# Patient Record
Sex: FEMALE | Race: WHITE | URBAN - NONMETROPOLITAN AREA
[De-identification: names, ages, dates, MRNs, and addresses within clinical notes are randomized per-mention and may not be internally consistent; named-entity substitution may affect disease eponyms.]

---

## 2022-10-26 ENCOUNTER — OFFICE VISIT (OUTPATIENT)
Dept: URBAN - NONMETROPOLITAN AREA CLINIC 13 | Facility: CLINIC | Age: 22
End: 2022-10-26
Payer: COMMERCIAL

## 2022-10-26 ENCOUNTER — WEB ENCOUNTER (OUTPATIENT)
Dept: URBAN - NONMETROPOLITAN AREA CLINIC 13 | Facility: CLINIC | Age: 22
End: 2022-10-26

## 2022-10-26 ENCOUNTER — LAB OUTSIDE AN ENCOUNTER (OUTPATIENT)
Dept: URBAN - NONMETROPOLITAN AREA CLINIC 2 | Facility: CLINIC | Age: 22
End: 2022-10-26

## 2022-10-26 ENCOUNTER — TELEPHONE ENCOUNTER (OUTPATIENT)
Dept: URBAN - METROPOLITAN AREA CLINIC 92 | Facility: CLINIC | Age: 22
End: 2022-10-26

## 2022-10-26 ENCOUNTER — LAB OUTSIDE AN ENCOUNTER (OUTPATIENT)
Dept: URBAN - NONMETROPOLITAN AREA CLINIC 13 | Facility: CLINIC | Age: 22
End: 2022-10-26

## 2022-10-26 VITALS
HEIGHT: 64 IN | HEART RATE: 105 BPM | BODY MASS INDEX: 30.8 KG/M2 | DIASTOLIC BLOOD PRESSURE: 85 MMHG | SYSTOLIC BLOOD PRESSURE: 119 MMHG | WEIGHT: 180.4 LBS

## 2022-10-26 DIAGNOSIS — K50.918 CROHN'S DISEASE WITH OTHER COMPLICATION, UNSPECIFIED GASTROINTESTINAL TRACT LOCATION: ICD-10-CM

## 2022-10-26 DIAGNOSIS — Z12.11 COLON CANCER SCREENING: ICD-10-CM

## 2022-10-26 LAB
A/G RATIO: 1.2
ALBUMIN: 4.5
ALKALINE PHOSPHATASE: 65
ALT (SGPT): 19
ANION GAP: 13
AST (SGOT): 18
BASOPHILS AUTOMATED ABSOLUTE COUNT: 0.1
BASOPHILS RELATIVE PERCENT: 0.8
BILIRUBIN TOTAL: 0.4
BLOOD UREA NITROGEN: 9
BUN / CREAT RATIO: 17
C-REACTIVE PROTEIN: 0.03
CALCIUM: 9.7
CHLORIDE: 107
CO2: 21
CREATININE, SERUM: 0.54
EGFR (CKD-EPI): >60
EOSINOPHILS AUTOMATED ABSOLUTE COUNT: 0.3
EOSINOPHILS RELATIVE PERCENT: 3.2
GLUCOSE: 93
HEMATOCRIT: 38.3
HEMOGLOBIN: 12.7
LYMPHOCYTES AUTOMATED ABSOLUTE COUNT: 1.8
LYMPHOCYTES RELATIVE PERCENT: 22.7
MEAN CORPUSCULAR HEMOGLOBIN CONC: 33.2
MEAN CORPUSCULAR HEMOGLOBIN: 28.5
MEAN CORPUSCULAR VOLUME: 85.9
MEAN PLATELET VOLUME: 7.6
MONOCYTES AUTOMATED ABSOLUTE COUNT: 0.4
MONOCYTES RELATIVE PERCENT: 4.6
NEUTROPHILS AUTOMATED ABSOLUTE: 5.5
NEUTROPHILS RELATIVE PERCENT: 68.7
PLATELET COUNT: 284
POTASSIUM: 4.1
PROTEIN TOTAL: 8.4
RED BLOOD CELL COUNT: 4.46
RED CELL DISTRIBUTION WIDTH: 13.1
SEDIMENTATION RATE, ERYTHROCYTE: 19
SODIUM: 137
WHITE BLOOD CELL COUNT: 7.9

## 2022-10-26 PROCEDURE — 99244 OFF/OP CNSLTJ NEW/EST MOD 40: CPT | Performed by: INTERNAL MEDICINE

## 2022-10-26 PROCEDURE — 99244 OFF/OP CNSLTJ NEW/EST MOD 40: CPT | Performed by: NURSE PRACTITIONER

## 2022-10-26 PROCEDURE — 99204 OFFICE O/P NEW MOD 45 MIN: CPT | Performed by: INTERNAL MEDICINE

## 2022-10-26 RX ORDER — INFLIXIMAB 100 MG/10ML
AS DIRECTED INJECTION, POWDER, LYOPHILIZED, FOR SOLUTION INTRAVENOUS
Status: ACTIVE | COMMUNITY

## 2022-10-26 RX ORDER — SODIUM PICOSULFATE, MAGNESIUM OXIDE, AND ANHYDROUS CITRIC ACID 10; 3.5; 12 MG/160ML; G/160ML; G/160ML
160ML X 2 AS DIRECTED LIQUID ORAL ONCE
Qty: 320 MILLILITER | Refills: 0 | OUTPATIENT
Start: 2022-10-26 | End: 2022-10-27

## 2022-10-26 RX ORDER — INFLIXIMAB 100 MG/10ML
7.5MG/KG EVERY 6 WEEKS INJECTION, POWDER, LYOPHILIZED, FOR SOLUTION INTRAVENOUS
Qty: 6 UNSPECIFIED | Refills: 7 | OUTPATIENT
Start: 2022-10-26 | End: 2023-10-21

## 2022-10-29 LAB
MITOGEN-NIL: >10
NIL: 0.16
QUANTIFERON-TB PLUS,1T: NEGATIVE
TB1-NIL: 0.03
TB2-NIL: 0.02

## 2022-11-08 ENCOUNTER — TELEPHONE ENCOUNTER (OUTPATIENT)
Dept: URBAN - NONMETROPOLITAN AREA CLINIC 2 | Facility: CLINIC | Age: 22
End: 2022-11-08

## 2022-12-22 ENCOUNTER — TELEPHONE ENCOUNTER (OUTPATIENT)
Dept: URBAN - NONMETROPOLITAN AREA CLINIC 13 | Facility: CLINIC | Age: 22
End: 2022-12-22

## 2022-12-22 RX ORDER — PREDNISONE 20 MG/1
40MG X7 DAYS, 30MGX 7 DAYS, 20MG X 7 DAYS, AND 10MG X7 DAYS AND STOP TABLET ORAL ONCE A DAY
Qty: 35 TABLET | Refills: 0 | OUTPATIENT
Start: 2022-12-22 | End: 2023-01-19

## 2023-02-13 ENCOUNTER — CLAIMS CREATED FROM THE CLAIM WINDOW (OUTPATIENT)
Dept: URBAN - NONMETROPOLITAN AREA SURGERY CENTER 1 | Facility: SURGERY CENTER | Age: 23
End: 2023-02-13
Payer: COMMERCIAL

## 2023-02-13 ENCOUNTER — CLAIMS CREATED FROM THE CLAIM WINDOW (OUTPATIENT)
Dept: URBAN - NONMETROPOLITAN AREA SURGERY CENTER 1 | Facility: SURGERY CENTER | Age: 23
End: 2023-02-13

## 2023-02-13 ENCOUNTER — CLAIMS CREATED FROM THE CLAIM WINDOW (OUTPATIENT)
Dept: URBAN - METROPOLITAN AREA CLINIC 4 | Facility: CLINIC | Age: 23
End: 2023-02-13
Payer: COMMERCIAL

## 2023-02-13 DIAGNOSIS — K50.80 CROHN'S COLITIS: ICD-10-CM

## 2023-02-13 DIAGNOSIS — K63.89 APPENDICITIS EPIPLOICA: ICD-10-CM

## 2023-02-13 DIAGNOSIS — K31.89 OTHER DISEASES OF STOMACH AND DUODENUM: ICD-10-CM

## 2023-02-13 PROCEDURE — G8907 PT DOC NO EVENTS ON DISCHARG: HCPCS | Performed by: INTERNAL MEDICINE

## 2023-02-13 PROCEDURE — 45380 COLONOSCOPY AND BIOPSY: CPT | Performed by: INTERNAL MEDICINE

## 2023-02-13 PROCEDURE — 88305 TISSUE EXAM BY PATHOLOGIST: CPT | Performed by: PATHOLOGY

## 2023-02-13 RX ORDER — INFLIXIMAB 100 MG/10ML
7.5MG/KG EVERY 6 WEEKS INJECTION, POWDER, LYOPHILIZED, FOR SOLUTION INTRAVENOUS
Qty: 6 UNSPECIFIED | Refills: 7 | Status: ACTIVE | COMMUNITY
Start: 2022-10-26 | End: 2023-10-21

## 2023-02-13 RX ORDER — INFLIXIMAB 100 MG/10ML
AS DIRECTED INJECTION, POWDER, LYOPHILIZED, FOR SOLUTION INTRAVENOUS
Status: ACTIVE | COMMUNITY

## 2023-02-15 ENCOUNTER — TELEPHONE ENCOUNTER (OUTPATIENT)
Dept: URBAN - NONMETROPOLITAN AREA CLINIC 2 | Facility: CLINIC | Age: 23
End: 2023-02-15

## 2023-02-24 ENCOUNTER — TELEPHONE ENCOUNTER (OUTPATIENT)
Dept: URBAN - METROPOLITAN AREA CLINIC 92 | Facility: CLINIC | Age: 23
End: 2023-02-24

## 2023-02-27 ENCOUNTER — TELEPHONE ENCOUNTER (OUTPATIENT)
Dept: URBAN - NONMETROPOLITAN AREA CLINIC 2 | Facility: CLINIC | Age: 23
End: 2023-02-27

## 2023-02-28 ENCOUNTER — WEB ENCOUNTER (OUTPATIENT)
Dept: URBAN - NONMETROPOLITAN AREA CLINIC 2 | Facility: CLINIC | Age: 23
End: 2023-02-28

## 2023-02-28 RX ORDER — INFLIXIMAB-DYYB 100 MG/10ML
7.5MG/KG INJECTION, POWDER, LYOPHILIZED, FOR SOLUTION INTRAVENOUS
Qty: 1 UNSPECIFIED | Refills: 8 | OUTPATIENT
Start: 2023-02-28 | End: 2024-03-12

## 2023-02-28 RX ORDER — INFLIXIMAB 100 MG/10ML
7.5MG/KG EVERY 6 WEEKS INJECTION, POWDER, LYOPHILIZED, FOR SOLUTION INTRAVENOUS
Qty: 6 UNSPECIFIED | Refills: 7 | Status: ACTIVE | COMMUNITY
Start: 2022-10-26 | End: 2023-10-21

## 2023-02-28 RX ORDER — INFLIXIMAB 100 MG/10ML
AS DIRECTED INJECTION, POWDER, LYOPHILIZED, FOR SOLUTION INTRAVENOUS
Status: ACTIVE | COMMUNITY

## 2023-03-20 ENCOUNTER — WEB ENCOUNTER (OUTPATIENT)
Dept: URBAN - NONMETROPOLITAN AREA CLINIC 13 | Facility: CLINIC | Age: 23
End: 2023-03-20

## 2023-03-20 ENCOUNTER — TELEPHONE ENCOUNTER (OUTPATIENT)
Dept: URBAN - NONMETROPOLITAN AREA CLINIC 13 | Facility: CLINIC | Age: 23
End: 2023-03-20

## 2023-03-22 ENCOUNTER — OFFICE VISIT (OUTPATIENT)
Dept: URBAN - NONMETROPOLITAN AREA CLINIC 13 | Facility: CLINIC | Age: 23
End: 2023-03-22
Payer: COMMERCIAL

## 2023-03-22 VITALS
BODY MASS INDEX: 30.39 KG/M2 | TEMPERATURE: 97.8 F | SYSTOLIC BLOOD PRESSURE: 148 MMHG | HEART RATE: 93 BPM | DIASTOLIC BLOOD PRESSURE: 97 MMHG | WEIGHT: 178 LBS | HEIGHT: 64 IN

## 2023-03-22 DIAGNOSIS — Z12.11 COLON CANCER SCREENING: ICD-10-CM

## 2023-03-22 DIAGNOSIS — K50.918 CROHN'S DISEASE WITH OTHER COMPLICATION, UNSPECIFIED GASTROINTESTINAL TRACT LOCATION: ICD-10-CM

## 2023-03-22 PROCEDURE — 99214 OFFICE O/P EST MOD 30 MIN: CPT | Performed by: NURSE PRACTITIONER

## 2023-03-22 RX ORDER — INFLIXIMAB 100 MG/10ML
7.5MG/KG EVERY 6 WEEKS INJECTION, POWDER, LYOPHILIZED, FOR SOLUTION INTRAVENOUS
Qty: 6 UNSPECIFIED | Refills: 7 | Status: ACTIVE | COMMUNITY
Start: 2022-10-26 | End: 2023-10-21

## 2023-03-22 RX ORDER — BUDESONIDE 3 MG/1
3 CAPSULES CAPSULE, DELAYED RELEASE PELLETS ORAL ONCE A DAY
Qty: 90 CAPSULE | Refills: 1 | OUTPATIENT
Start: 2023-03-22

## 2023-03-22 RX ORDER — INFLIXIMAB-DYYB 100 MG/10ML
7.5MG/KG INJECTION, POWDER, LYOPHILIZED, FOR SOLUTION INTRAVENOUS
Qty: 1 UNSPECIFIED | Refills: 8 | Status: ACTIVE | COMMUNITY
Start: 2023-02-28 | End: 2024-03-12

## 2023-03-22 RX ORDER — INFLIXIMAB 100 MG/10ML
AS DIRECTED INJECTION, POWDER, LYOPHILIZED, FOR SOLUTION INTRAVENOUS
Status: ACTIVE | COMMUNITY

## 2023-03-22 NOTE — HPI-TODAY'S VISIT:
10/26/2022 Graciela is a 21-year-old female referred to me by Dr. Isacc Doll for consultation and to establish care for Crohn's disease.  At age 15 she underwent colonoscopy by Dr. Wolf Hall and was diagnosed with Crohn's disease.  She was referred to Dr. Luis Mohamud.  She was initiated on Humira at first however she never obtained remission.  She was transitioned to infliximab at age 17 and subsequently underwent TI resection with 2 feet of her small bowel removed at [facility] .  Since then she has been in remission on Remicade every 6 weeks, we do not have her records and she is not sure of her dosing.  Her last dose as listed below in late September she is due in 2 weeks.  She states that her bowels move regularly, she has not had a flare in years.  She states her last colonoscopy was done approximately 3 years ago by Dr. Mohamud.  She is not sure of these results.  She has not had her B12 tested, she has never been supplemented.  She has never had a Pap smear.  She has not had any skin cancer screening.  She is newly , and works full-time at [employer].  She does plan on conceiving in the next 2 to 3 years.  Today we have had a discussion regarding surveillance colonoscopy, annual Pap screening, annual skin cancer screening, B12 evaluation given her TI resection, and infusion set up.  We will refer her to gynecology and dermatology.  We will get her set up with [facility] infusion center for her Remicade dosing every 6 weeks once we know her infusion dose, she was [facility] [place].  Today she is doing well, we have had a long discussion regarding her disease and her questions were answered.  I will see her back after her procedure.  MB 3/22/2023 Graciela presents for follow-up of Crohn's disease.  Her repeat colonoscopy reveals aphthous ulcers in her TI, and active intact anastomosis with mild erythema, normal colon and mild proctitis.  Her biopsy showed focal active ileitis and normal colon and rectal biopsies.  She was having a soft bowel movement daily.  She is due for her Remicade this past Monday.  We have had to transition to Inflectra due to her insurance and [facility] is awaiting prior authorization hopefully to be scheduled next week.  She felt that the prep and the procedure caused a mild flare.  She agrees to start budesonide 9 mg daily for 8 weeks.  We will pursue Inflectra at 7.5 mg/kg every 6 weeks once it is approved.  Overall today she is doing fairly well.  She is still not gotten her skin cancer screening on her Pap smear.  We have discussed pursuing this this year.  Otherwise she is doing well today.  MB

## 2023-04-10 ENCOUNTER — WEB ENCOUNTER (OUTPATIENT)
Dept: URBAN - NONMETROPOLITAN AREA CLINIC 13 | Facility: CLINIC | Age: 23
End: 2023-04-10

## 2023-04-11 ENCOUNTER — WEB ENCOUNTER (OUTPATIENT)
Dept: URBAN - NONMETROPOLITAN AREA CLINIC 13 | Facility: CLINIC | Age: 23
End: 2023-04-11

## 2023-04-17 ENCOUNTER — WEB ENCOUNTER (OUTPATIENT)
Dept: URBAN - NONMETROPOLITAN AREA CLINIC 13 | Facility: CLINIC | Age: 23
End: 2023-04-17

## 2023-05-26 ENCOUNTER — WEB ENCOUNTER (OUTPATIENT)
Dept: URBAN - NONMETROPOLITAN AREA CLINIC 13 | Facility: CLINIC | Age: 23
End: 2023-05-26

## 2023-05-26 RX ORDER — PREDNISONE 10 MG/1
4 TABLET DAILY BY MOUTH X 5 DAYS, 3 TAB DAILY X 5 DAYS, 2 TAB DAILY X 5 DAYS, 1 TAB DAILY X 5 DAYS TABLET ORAL AS DIRECTED
Qty: 50 DAYS | Refills: 0 | OUTPATIENT
Start: 2023-05-30 | End: 2023-06-19

## 2023-06-01 ENCOUNTER — WEB ENCOUNTER (OUTPATIENT)
Dept: URBAN - NONMETROPOLITAN AREA CLINIC 13 | Facility: CLINIC | Age: 23
End: 2023-06-01

## 2023-09-13 ENCOUNTER — LAB OUTSIDE AN ENCOUNTER (OUTPATIENT)
Dept: URBAN - NONMETROPOLITAN AREA CLINIC 2 | Facility: CLINIC | Age: 23
End: 2023-09-13

## 2023-09-13 ENCOUNTER — OFFICE VISIT (OUTPATIENT)
Dept: URBAN - NONMETROPOLITAN AREA CLINIC 13 | Facility: CLINIC | Age: 23
End: 2023-09-13
Payer: COMMERCIAL

## 2023-09-13 VITALS
BODY MASS INDEX: 31.07 KG/M2 | HEIGHT: 64 IN | SYSTOLIC BLOOD PRESSURE: 141 MMHG | HEART RATE: 90 BPM | WEIGHT: 182 LBS | DIASTOLIC BLOOD PRESSURE: 85 MMHG | TEMPERATURE: 98.7 F

## 2023-09-13 DIAGNOSIS — K50.918 CROHN'S DISEASE WITH OTHER COMPLICATION, UNSPECIFIED GASTROINTESTINAL TRACT LOCATION: ICD-10-CM

## 2023-09-13 DIAGNOSIS — Z12.11 COLON CANCER SCREENING: ICD-10-CM

## 2023-09-13 PROBLEM — 305058001: Status: ACTIVE | Noted: 2022-10-26

## 2023-09-13 LAB
A/G RATIO: 1.1
ALBUMIN: 4.4
ALKALINE PHOSPHATASE: 66
ALT (SGPT): 18
ANION GAP: 13
AST (SGOT): 16
BASOPHILS AUTOMATED ABSOLUTE COUNT: 0
BASOPHILS RELATIVE PERCENT: 0.4
BILIRUBIN TOTAL: 0.4
BLOOD UREA NITROGEN: 9
BUN / CREAT RATIO: 15
C-REACTIVE PROTEIN: 0.05
CALCIUM: 9.5
CHLORIDE: 105
CO2: 24
CREATININE, SERUM: 0.62
EGFR (CKD-EPI): >60
EOSINOPHILS AUTOMATED ABSOLUTE COUNT: 0.2
EOSINOPHILS RELATIVE PERCENT: 3.1
GLUCOSE: 90
HEMATOCRIT: 36.6
HEMOGLOBIN: 12.4
LYMPHOCYTES AUTOMATED ABSOLUTE COUNT: 2.5
LYMPHOCYTES RELATIVE PERCENT: 31.4
MEAN CORPUSCULAR HEMOGLOBIN CONC: 33.8
MEAN CORPUSCULAR HEMOGLOBIN: 28.9
MEAN CORPUSCULAR VOLUME: 85.3
MEAN PLATELET VOLUME: 7.4
MONOCYTES AUTOMATED ABSOLUTE COUNT: 0.4
MONOCYTES RELATIVE PERCENT: 5.4
NEUTROPHILS AUTOMATED ABSOLUTE: 4.8
NEUTROPHILS RELATIVE PERCENT: 59.7
PLATELET COUNT: 314
POTASSIUM: 3.8
PROTEIN TOTAL: 8.4
RED BLOOD CELL COUNT: 4.29
RED CELL DISTRIBUTION WIDTH: 13.1
SEDIMENTATION RATE, ERYTHROCYTE: 23
SODIUM: 138
WHITE BLOOD CELL COUNT: 8

## 2023-09-13 PROCEDURE — 99214 OFFICE O/P EST MOD 30 MIN: CPT | Performed by: NURSE PRACTITIONER

## 2023-09-13 RX ORDER — BUDESONIDE 3 MG/1
3 CAPSULES CAPSULE, DELAYED RELEASE PELLETS ORAL ONCE A DAY
Qty: 90 CAPSULE | Refills: 1 | OUTPATIENT
Start: 2023-09-13

## 2023-09-13 RX ORDER — BUDESONIDE 3 MG/1
3 CAPSULES CAPSULE, DELAYED RELEASE PELLETS ORAL ONCE A DAY
Qty: 90 CAPSULE | Refills: 1 | Status: ACTIVE | COMMUNITY
Start: 2023-03-22

## 2023-09-13 RX ORDER — INFLIXIMAB 100 MG/10ML
7.5MG/KG EVERY 6 WEEKS INJECTION, POWDER, LYOPHILIZED, FOR SOLUTION INTRAVENOUS
Qty: 6 UNSPECIFIED | Refills: 7 | Status: ACTIVE | COMMUNITY
Start: 2022-10-26 | End: 2023-10-21

## 2023-09-13 RX ORDER — INFLIXIMAB-DYYB 100 MG/10ML
7.5MG/KG INJECTION, POWDER, LYOPHILIZED, FOR SOLUTION INTRAVENOUS
Qty: 1 UNSPECIFIED | Refills: 8 | Status: ACTIVE | COMMUNITY
Start: 2023-02-28 | End: 2024-03-12

## 2023-09-13 RX ORDER — INFLIXIMAB 100 MG/10ML
AS DIRECTED INJECTION, POWDER, LYOPHILIZED, FOR SOLUTION INTRAVENOUS
Status: ACTIVE | COMMUNITY

## 2023-09-13 NOTE — HPI-TODAY'S VISIT:
10/26/2022 Graciela is a 21-year-old female referred to me by Dr. Isacc Doll for consultation and to establish care for Crohn's disease.  At age 15 she underwent colonoscopy by Dr. Wolf Hall and was diagnosed with Crohn's disease.  She was referred to Dr. Luis Mohamud.  She was initiated on Humira at first however she never obtained remission.  She was transitioned to infliximab at age 17 and subsequently underwent TI resection with 2 feet of her small bowel removed at [facility] .  Since then she has been in remission on Remicade every 6 weeks, we do not have her records and she is not sure of her dosing.  Her last dose as listed below in late September she is due in 2 weeks.  She states that her bowels move regularly, she has not had a flare in years.  She states her last colonoscopy was done approximately 3 years ago by Dr. Mohamud.  She is not sure of these results.  She has not had her B12 tested, she has never been supplemented.  She has never had a Pap smear.  She has not had any skin cancer screening.  She is newly , and works full-time at [employer].  She does plan on conceiving in the next 2 to 3 years.  Today we have had a discussion regarding surveillance colonoscopy, annual Pap screening, annual skin cancer screening, B12 evaluation given her TI resection, and infusion set up.  We will refer her to gynecology and dermatology.  We will get her set up with [facility] infusion center for her Remicade dosing every 6 weeks once we know her infusion dose, she was [facility] [place].  Today she is doing well, we have had a long discussion regarding her disease and her questions were answered.  I will see her back after her procedure.  MB 3/22/2023 Graciela presents for follow-up of Crohn's disease.  Her repeat colonoscopy reveals aphthous ulcers in her TI, and active intact anastomosis with mild erythema, normal colon and mild proctitis.  Her biopsy showed focal active ileitis and normal colon and rectal biopsies.  She was having a soft bowel movement daily.  She is due for her Remicade this past Monday.  We have had to transition to Inflectra due to her insurance and [facility] is awaiting prior authorization hopefully to be scheduled next week.  She felt that the prep and the procedure caused a mild flare.  She agrees to start budesonide 9 mg daily for 8 weeks.  We will pursue Inflectra at 7.5 mg/kg every 6 weeks once it is approved.  Overall today she is doing fairly well.  She is still not gotten her skin cancer screening on her Pap smear.  We have discussed pursuing this this year.  Otherwise she is doing well today.  MB 9/13/2023 Graciela presents for follow-up of Crohn's disease.  Since her last visit her infliximab infusion was transition to Inflectra at [facility].  She is on 7.5 mg/kg every 6 weeks.  Her last dose was in August.  She actually feels better on the Inflectra.  She has good and bad days.  On a bad day she may have up to 5 loose bowel movements, on a good day she may have down to 2-3.  She denies any abdominal pain.  She denies any blood or mucus.  She did feel better after a course of budesonide.  Today we discussed repeating a course of budesonide and checking her infliximab levels.  We will follow-up pending her clinical response.  MB

## 2023-09-14 ENCOUNTER — TELEPHONE ENCOUNTER (OUTPATIENT)
Dept: URBAN - NONMETROPOLITAN AREA CLINIC 2 | Facility: CLINIC | Age: 23
End: 2023-09-14

## 2024-01-24 ENCOUNTER — OFFICE VISIT (OUTPATIENT)
Dept: URBAN - NONMETROPOLITAN AREA CLINIC 13 | Facility: CLINIC | Age: 24
End: 2024-01-24
Payer: COMMERCIAL

## 2024-01-24 ENCOUNTER — LAB OUTSIDE AN ENCOUNTER (OUTPATIENT)
Dept: URBAN - NONMETROPOLITAN AREA CLINIC 2 | Facility: CLINIC | Age: 24
End: 2024-01-24

## 2024-01-24 ENCOUNTER — DASHBOARD ENCOUNTERS (OUTPATIENT)
Age: 24
End: 2024-01-24

## 2024-01-24 VITALS
DIASTOLIC BLOOD PRESSURE: 76 MMHG | HEART RATE: 91 BPM | SYSTOLIC BLOOD PRESSURE: 111 MMHG | WEIGHT: 186.6 LBS | BODY MASS INDEX: 31.86 KG/M2 | TEMPERATURE: 97 F | HEIGHT: 64 IN

## 2024-01-24 DIAGNOSIS — K50.918 CROHN'S DISEASE WITH OTHER COMPLICATION, UNSPECIFIED GASTROINTESTINAL TRACT LOCATION: ICD-10-CM

## 2024-01-24 DIAGNOSIS — Z12.11 COLON CANCER SCREENING: ICD-10-CM

## 2024-01-24 PROBLEM — 34000006: Status: ACTIVE | Noted: 2022-10-26

## 2024-01-24 LAB
A/G RATIO: 1.1
ALBUMIN: 4.5
ALKALINE PHOSPHATASE: 62
ALT (SGPT): 23
ANION GAP: 14
AST (SGOT): 21
BASOPHILS AUTOMATED ABSOLUTE COUNT: 0
BASOPHILS RELATIVE PERCENT: 0.4
BILIRUBIN TOTAL: 0.3
BLOOD UREA NITROGEN: 9
BUN / CREAT RATIO: 18
CALCIUM: 9.6
CHLORIDE: 105
CO2: 23
CREATININE, SERUM: 0.49
EGFR (CKD-EPI): >60
EOSINOPHILS AUTOMATED ABSOLUTE COUNT: 0.4
EOSINOPHILS RELATIVE PERCENT: 2.9
GLUCOSE: 76
HEMATOCRIT: 36.5
HEMOGLOBIN: 12.5
HEPATITIS B SURFACE ANTIGEN: (no result)
LYMPHOCYTES AUTOMATED ABSOLUTE COUNT: 3.1
LYMPHOCYTES RELATIVE PERCENT: 24.8
MEAN CORPUSCULAR HEMOGLOBIN CONC: 34.1
MEAN CORPUSCULAR HEMOGLOBIN: 29
MEAN CORPUSCULAR VOLUME: 85.1
MEAN PLATELET VOLUME: 7.6
MONOCYTES AUTOMATED ABSOLUTE COUNT: 0.7
MONOCYTES RELATIVE PERCENT: 6
NEUTROPHILS AUTOMATED ABSOLUTE: 8.1
NEUTROPHILS RELATIVE PERCENT: 65.9
PLATELET COUNT: 295
POTASSIUM: 3.7
PROTEIN TOTAL: 8.5
RED BLOOD CELL COUNT: 4.29
RED CELL DISTRIBUTION WIDTH: 13.4
SODIUM: 138
VITAMIN B-12: 148
WHITE BLOOD CELL COUNT: 12.3

## 2024-01-24 PROCEDURE — 99214 OFFICE O/P EST MOD 30 MIN: CPT | Performed by: NURSE PRACTITIONER

## 2024-01-24 RX ORDER — BUDESONIDE 3 MG/1
3 CAPSULES CAPSULE, DELAYED RELEASE PELLETS ORAL ONCE A DAY
Qty: 90 CAPSULE | Refills: 1 | Status: ACTIVE | COMMUNITY
Start: 2023-09-13

## 2024-01-24 RX ORDER — INFLIXIMAB-DYYB 100 MG/10ML
7.5MG/KG INJECTION, POWDER, LYOPHILIZED, FOR SOLUTION INTRAVENOUS
Qty: 1 UNSPECIFIED | Refills: 8 | Status: ACTIVE | COMMUNITY
Start: 2023-02-28 | End: 2024-03-12

## 2024-01-24 NOTE — HPI-TODAY'S VISIT:
10/26/2022 Graciela is a 21-year-old female referred to me by Dr. Isacc Doll for consultation and to establish care for Crohn's disease.  At age 15 she underwent colonoscopy by Dr. Wolf Hall and was diagnosed with Crohn's disease.  She was referred to Dr. Luis Mohamud.  She was initiated on Humira at first however she never obtained remission.  She was transitioned to infliximab at age 17 and subsequently underwent TI resection with 2 feet of her small bowel removed at [facility] .  Since then she has been in remission on Remicade every 6 weeks, we do not have her records and she is not sure of her dosing.  Her last dose as listed below in late September she is due in 2 weeks.  She states that her bowels move regularly, she has not had a flare in years.  She states her last colonoscopy was done approximately 3 years ago by Dr. Mohamud.  She is not sure of these results.  She has not had her B12 tested, she has never been supplemented.  She has never had a Pap smear.  She has not had any skin cancer screening.  She is newly , and works full-time at [employer].  She does plan on conceiving in the next 2 to 3 years.  Today we have had a discussion regarding surveillance colonoscopy, annual Pap screening, annual skin cancer screening, B12 evaluation given her TI resection, and infusion set up.  We will refer her to gynecology and dermatology.  We will get her set up with [facility] infusion center for her Remicade dosing every 6 weeks once we know her infusion dose, she was being infused [facility] [place].  Today she is doing well, we have had a long discussion regarding her disease and her questions were answered.  I will see her back after her procedure.  MB 3/22/2023 Graciela presents for follow-up of Crohn's disease.  Her repeat colonoscopy reveals aphthous ulcers in her TI, and active intact anastomosis with mild erythema, normal colon and mild proctitis.  Her biopsy showed focal active ileitis and normal colon and rectal biopsies.  She was having a soft bowel movement daily.  She is due for her Remicade this past Monday.  We have had to transition to Inflectra due to her insurance and [facility] is awaiting prior authorization hopefully to be scheduled next week.  She felt that the prep and the procedure caused a mild flare.  She agrees to start budesonide 9 mg daily for 8 weeks.  We will pursue Inflectra at 7.5 mg/kg every 6 weeks once it is approved.  Overall today she is doing fairly well.  She is still not gotten her skin cancer screening on her Pap smear.  We have discussed pursuing this this year.  Otherwise she is doing well today.  MB 9/13/2023 Graciela presents for follow-up of Crohn's disease.  Since her last visit her infliximab infusion was transition to Inflectra at [facility].  She is on 7.5 mg/kg every 6 weeks.  Her last dose was in August.  She actually feels better on the Inflectra.  She has good and bad days.  On a bad day she may have up to 5 loose bowel movements, on a good day she may have down to 2-3.  She denies any abdominal pain.  She denies any blood or mucus.  She did feel better after a course of budesonide.  Today we discussed repeating a course of budesonide and checking her infliximab levels.  We will follow-up pending her clinical response.  MB Graciela presents for follow-up of Crohn's disease.  She is doing great on infliximab 7.5 mg/kg every 6 weeks.  She is on Inflectra at [place], she is tolerating this well.  She status post budesonide 9 mg daily for 8 weeks since her last visit with resolution of her diarrhea.  Today she is doing well otherwise.  We will refer her to dermatology for her skin cancer screening.  She does need an annual Pap smear.  Today she denies any new GI complaints. MB

## 2024-01-28 LAB
HEPATITIS B CORE TOTAL ANTIBODY: NONREACTIVE
MITOGEN-NIL: >10
NIL: 0.21
QUANTIFERON-TB PLUS,1T: NEGATIVE
TB1-NIL: 0.18
TB2-NIL: 0.08

## 2024-01-29 ENCOUNTER — WEB ENCOUNTER (OUTPATIENT)
Dept: URBAN - NONMETROPOLITAN AREA CLINIC 13 | Facility: CLINIC | Age: 24
End: 2024-01-29

## 2024-01-29 ENCOUNTER — TELEPHONE ENCOUNTER (OUTPATIENT)
Dept: URBAN - NONMETROPOLITAN AREA CLINIC 2 | Facility: CLINIC | Age: 24
End: 2024-01-29

## 2024-01-29 PROBLEM — 399357009: Status: ACTIVE | Noted: 2024-01-29

## 2024-01-29 RX ORDER — BUDESONIDE 3 MG/1
3 CAPSULES CAPSULE ORAL ONCE A DAY
Qty: 90 CAPSULE | Refills: 1 | OUTPATIENT
Start: 2024-01-31

## 2024-01-29 RX ORDER — CYANOCOBALAMIN 1000 UG/ML
1 MILLILITER EVERY 7 DAYS FOR 4 WEEKS, THEN EVERY 30 DAYS INJECTION INTRAMUSCULAR; SUBCUTANEOUS
Qty: 4 UNSPECIFIED | Refills: 11 | OUTPATIENT
Start: 2024-01-29 | End: 2025-01-23

## 2024-01-31 ENCOUNTER — WEB ENCOUNTER (OUTPATIENT)
Dept: URBAN - NONMETROPOLITAN AREA CLINIC 13 | Facility: CLINIC | Age: 24
End: 2024-01-31

## 2024-01-31 RX ORDER — CYANOCOBALAMIN 1000 UG/ML
1 MILLILITER EVERY 7 DAYS FOR 4 WEEKS, THEN EVERY 30 DAYS INJECTION INTRAMUSCULAR; SUBCUTANEOUS
Qty: 4 UNSPECIFIED | Refills: 11
Start: 2024-01-29 | End: 2025-01-25

## 2024-01-31 RX ORDER — CYANOCOBALAMIN 1000 UG/ML
1 MILLILITER EVERY 7 DAYS FOR 4 WEEKS, THEN EVERY 30 DAYS INJECTION INTRAMUSCULAR; SUBCUTANEOUS
Qty: 4 UNSPECIFIED | Refills: 11
Start: 2024-01-29 | End: 2025-01-26

## 2024-02-26 ENCOUNTER — LAB (OUTPATIENT)
Dept: URBAN - NONMETROPOLITAN AREA CLINIC 2 | Facility: CLINIC | Age: 24
End: 2024-02-26

## 2024-05-14 ENCOUNTER — WEB ENCOUNTER (OUTPATIENT)
Dept: URBAN - NONMETROPOLITAN AREA CLINIC 13 | Facility: CLINIC | Age: 24
End: 2024-05-14

## 2024-05-14 RX ORDER — INFLIXIMAB-DYYB 100 MG/10ML
7.5MG/KG EVERY 6 WEEKS INJECTION, POWDER, LYOPHILIZED, FOR SOLUTION INTRAVENOUS
Qty: 1 UNSPECIFIED | Refills: 6 | OUTPATIENT
Start: 2024-05-14 | End: 2025-03-03

## 2024-06-13 ENCOUNTER — OFFICE VISIT (OUTPATIENT)
Dept: URBAN - NONMETROPOLITAN AREA CLINIC 2 | Facility: CLINIC | Age: 24
End: 2024-06-13
Payer: COMMERCIAL

## 2024-06-13 VITALS
HEIGHT: 64 IN | WEIGHT: 189 LBS | HEART RATE: 98 BPM | TEMPERATURE: 98 F | DIASTOLIC BLOOD PRESSURE: 83 MMHG | BODY MASS INDEX: 32.27 KG/M2 | SYSTOLIC BLOOD PRESSURE: 134 MMHG

## 2024-06-13 DIAGNOSIS — K50.918 CROHN'S DISEASE WITH OTHER COMPLICATION, UNSPECIFIED GASTROINTESTINAL TRACT LOCATION: ICD-10-CM

## 2024-06-13 DIAGNOSIS — Z34.90 PREGNANCY, UNSPECIFIED GESTATIONAL AGE: ICD-10-CM

## 2024-06-13 DIAGNOSIS — E51.9 VITAMIN B1 DEFICIENCY: ICD-10-CM

## 2024-06-13 DIAGNOSIS — Z12.11 COLON CANCER SCREENING: ICD-10-CM

## 2024-06-13 PROBLEM — 77386006: Status: ACTIVE | Noted: 2024-06-13

## 2024-06-13 PROCEDURE — 99214 OFFICE O/P EST MOD 30 MIN: CPT | Performed by: NURSE PRACTITIONER

## 2024-06-13 RX ORDER — BUDESONIDE 3 MG/1
3 CAPSULES CAPSULE ORAL ONCE A DAY
Qty: 90 CAPSULE | Refills: 1 | Status: ACTIVE | COMMUNITY
Start: 2024-01-31

## 2024-06-13 RX ORDER — INFLIXIMAB-DYYB 100 MG/10ML
7.5MG/KG EVERY 6 WEEKS INJECTION, POWDER, LYOPHILIZED, FOR SOLUTION INTRAVENOUS
Qty: 1 UNSPECIFIED | Refills: 6 | Status: ACTIVE | COMMUNITY
Start: 2024-05-14 | End: 2025-03-03

## 2024-06-13 RX ORDER — CYANOCOBALAMIN 1000 UG/ML
1 MILLILITER EVERY 7 DAYS FOR 4 WEEKS, THEN EVERY 30 DAYS INJECTION INTRAMUSCULAR; SUBCUTANEOUS
Qty: 4 UNSPECIFIED | Refills: 11 | Status: ACTIVE | COMMUNITY
Start: 2024-01-29 | End: 2025-01-26

## 2024-06-13 NOTE — HPI-TODAY'S VISIT:
10/26/2022 Graciela is a 21-year-old female referred to me by Dr. Isacc Doll for consultation and to establish care for Crohn's disease.  At age 15 she underwent colonoscopy by Dr. Wolf Hall and was diagnosed with Crohn's disease.  She was referred to Dr. Luis Mohamud.  She was initiated on Humira at first however she never obtained remission.  She was transitioned to infliximab at age 17 and subsequently underwent TI resection with 2 feet of her small bowel removed at [facility] .  Since then she has been in remission on Remicade every 6 weeks, we do not have her records and she is not sure of her dosing.  Her last dose as listed below in late September she is due in 2 weeks.  She states that her bowels move regularly, she has not had a flare in years.  She states her last colonoscopy was done approximately 3 years ago by Dr. Mohamud.  She is not sure of these results.  She has not had her B12 tested, she has never been supplemented.  She has never had a Pap smear.  She has not had any skin cancer screening.  She is newly , and works full-time at [email or web address].  She does plan on conceiving in the next 2 to 3 years.  Today we have had a discussion regarding surveillance colonoscopy, annual Pap screening, annual skin cancer screening, B12 evaluation given her TI resection, and infusion set up.  We will refer her to gynecology and dermatology.  We will get her set up with [facility] infusion center for her Remicade dosing every 6 weeks once we know her infusion dose, she was being infused at [facility] in [place].  Today she is doing well, we have had a long discussion regarding her disease and her questions were answered.  I will see her back after her procedure.  MB 3/22/2023 Graciela presents for follow-up of Crohn's disease.  Her repeat colonoscopy reveals aphthous ulcers in her TI, and active intact anastomosis with mild erythema, normal colon and mild proctitis.  Her biopsy showed focal active ileitis and normal colon and rectal biopsies.  She was having a soft bowel movement daily.  She is due for her Remicade this past Monday.  We have had to transition to Inflectra due to her insurance and [facility] is awaiting prior authorization hopefully to be scheduled next week.  She felt that the prep and the procedure caused a mild flare.  She agrees to start budesonide 9 mg daily for 8 weeks.  We will pursue Inflectra at 7.5 mg/kg every 6 weeks once it is approved.  Overall today she is doing fairly well.  She is still not gotten her skin cancer screening on her Pap smear.  We have discussed pursuing this this year.  Otherwise she is doing well today.  MB 9/13/2023 Graciela presents for follow-up of Crohn's disease.  Since her last visit her infliximab infusion was transition to Inflectra at [facility].  She is on 7.5 mg/kg every 6 weeks.  Her last dose was in August.  She actually feels better on the Inflectra.  She has good and bad days.  On a bad day she may have up to 5 loose bowel movements, on a good day she may have down to 2-3.  She denies any abdominal pain.  She denies any blood or mucus.  She did feel better after a course of budesonide.  Today we discussed repeating a course of budesonide and checking her infliximab levels.  We will follow-up pending her clinical response.  MB Graciela presents for follow-up of Crohn's disease.  She is doing great on infliximab 7.5 mg/kg every 6 weeks.  She is on Inflectra at [place], she is tolerating this well.  She status post budesonide 9 mg daily for 8 weeks since her last visit with resolution of her diarrhea.  Today she is doing well otherwise.  We will refer her to dermatology for her skin cancer screening.  She does need an annual Pap smear.  Today she denies any new GI complaints. MB 6/13/2024 Graciela presents for follow-up of Crohn's disease.  She is currently 11 weeks pregnant and doing great.  She is on infliximab 7.5 mg/kg every 6 weeks.  She took a course of budesonide prior to her last visit with significant improvement in her symptoms.  Her last colonoscopy in 2023 does show mild ileitis.  Today she is currently stable on her symptoms.  We have time to her infliximab as listed below for infusion.  She denies any new GI complaints.  MB

## 2024-06-14 ENCOUNTER — TELEPHONE ENCOUNTER (OUTPATIENT)
Dept: URBAN - NONMETROPOLITAN AREA CLINIC 2 | Facility: CLINIC | Age: 24
End: 2024-06-14

## 2024-06-14 LAB
A/G RATIO: 1.4
ABSOLUTE BASOPHILS: 27
ABSOLUTE EOSINOPHILS: 237
ABSOLUTE LYMPHOCYTES: 2475
ABSOLUTE MONOCYTES: 446
ABSOLUTE NEUTROPHILS: 5915
ALBUMIN: 4.4
ALKALINE PHOSPHATASE: 49
ALT (SGPT): 23
AST (SGOT): 18
BASOPHILS: 0.3
BILIRUBIN, TOTAL: 0.4
BUN/CREATININE RATIO: 18
BUN: 8
C-REACTIVE PROTEIN, QUANT: <3
CALCIUM: 9.4
CARBON DIOXIDE, TOTAL: 21
CHLORIDE: 104
CREATININE: 0.44
EGFR: 140
EOSINOPHILS: 2.6
FOLATE, SERUM: 23.8
GLOBULIN, TOTAL: 3.2
GLUCOSE: 79
HEMATOCRIT: 35.5
HEMOGLOBIN: 11.8
LYMPHOCYTES: 27.2
MCH: 28.7
MCHC: 33.2
MCV: 86.4
MONOCYTES: 4.9
MPV: 9.1
NEUTROPHILS: 65
PLATELET COUNT: 251
POTASSIUM: 3.9
PROTEIN, TOTAL: 7.6
RDW: 12.5
RED BLOOD CELL COUNT: 4.11
SED RATE BY MODIFIED: 22
SODIUM: 134
VITAMIN B12: 309
WHITE BLOOD CELL COUNT: 9.1

## 2024-07-24 ENCOUNTER — OFFICE VISIT (OUTPATIENT)
Dept: URBAN - NONMETROPOLITAN AREA CLINIC 13 | Facility: CLINIC | Age: 24
End: 2024-07-24